# Patient Record
Sex: FEMALE | Race: WHITE | NOT HISPANIC OR LATINO
[De-identification: names, ages, dates, MRNs, and addresses within clinical notes are randomized per-mention and may not be internally consistent; named-entity substitution may affect disease eponyms.]

---

## 2017-03-22 ENCOUNTER — CHARTING TRANS (OUTPATIENT)
Dept: OTHER | Age: 22
End: 2017-03-22

## 2017-08-21 ENCOUNTER — OFFICE VISIT (OUTPATIENT)
Dept: FAMILY MEDICINE CLINIC | Facility: CLINIC | Age: 22
End: 2017-08-21

## 2017-08-21 VITALS
BODY MASS INDEX: 22.18 KG/M2 | TEMPERATURE: 98 F | WEIGHT: 138 LBS | HEART RATE: 74 BPM | HEIGHT: 66 IN | SYSTOLIC BLOOD PRESSURE: 93 MMHG | DIASTOLIC BLOOD PRESSURE: 62 MMHG

## 2017-08-21 DIAGNOSIS — F32.A DEPRESSIVE DISORDER: ICD-10-CM

## 2017-08-21 DIAGNOSIS — Z00.00 ADULT GENERAL MEDICAL EXAM: Primary | ICD-10-CM

## 2017-08-21 DIAGNOSIS — K58.1 IRRITABLE BOWEL SYNDROME WITH CONSTIPATION: ICD-10-CM

## 2017-08-21 PROCEDURE — 99385 PREV VISIT NEW AGE 18-39: CPT | Performed by: FAMILY MEDICINE

## 2017-08-21 RX ORDER — DICYCLOMINE HYDROCHLORIDE 10 MG/1
10 CAPSULE ORAL 3 TIMES DAILY
COMMUNITY
Start: 2017-07-17 | End: 2018-09-21

## 2017-08-21 RX ORDER — NORGESTREL AND ETHINYL ESTRADIOL 0.3-0.03MG
1 KIT ORAL DAILY
COMMUNITY
Start: 2017-08-07

## 2017-08-21 RX ORDER — RANITIDINE 150 MG/1
150 TABLET ORAL 2 TIMES DAILY
COMMUNITY
Start: 2017-07-17 | End: 2018-03-29 | Stop reason: ALTCHOICE

## 2017-08-21 NOTE — PROGRESS NOTES
Patient ID: Brenda Butt is a 20year old female. HPI  Patient presents with:  Establish Care  IBS    She does not smoke, she is single, she works as a . She was formally diagnosed with IBS with constipation 1 month ago.   She history on file.       Social History  Social History   Marital status: Single  Spouse name: N/A    Years of education: N/A  Number of children: N/A     Occupational History  None on file     Social History Main Topics   Smoking status: Never Smoker    Smok affect   Vitals reviewed. Blood pressure 93/62, pulse 74, temperature 97.9 °F (36.6 °C), temperature source Oral, height 5' 6\" (1.676 m), weight 138 lb (62.6 kg), last menstrual period 08/08/2017, not currently breastfeeding.          ASSESSMENT/PLAN:

## 2017-08-26 ENCOUNTER — LAB ENCOUNTER (OUTPATIENT)
Dept: LAB | Age: 22
End: 2017-08-26
Attending: FAMILY MEDICINE
Payer: COMMERCIAL

## 2017-08-26 DIAGNOSIS — Z00.00 ADULT GENERAL MEDICAL EXAM: ICD-10-CM

## 2017-08-26 DIAGNOSIS — K58.9 IRRITABLE BOWEL SYNDROME: Primary | ICD-10-CM

## 2017-08-26 LAB
ALBUMIN SERPL BCP-MCNC: 4.2 G/DL (ref 3.5–4.8)
ALBUMIN/GLOB SERPL: 1.4 {RATIO} (ref 1–2)
ALP SERPL-CCNC: 52 U/L (ref 32–100)
ALT SERPL-CCNC: 13 U/L (ref 14–54)
ANION GAP SERPL CALC-SCNC: 8 MMOL/L (ref 0–18)
AST SERPL-CCNC: 19 U/L (ref 15–41)
BASOPHILS # BLD: 0.1 K/UL (ref 0–0.2)
BASOPHILS NFR BLD: 1 %
BILIRUB SERPL-MCNC: 0.8 MG/DL (ref 0.3–1.2)
BUN SERPL-MCNC: 5 MG/DL (ref 8–20)
BUN/CREAT SERPL: 7 (ref 10–20)
CALCIUM SERPL-MCNC: 9.2 MG/DL (ref 8.5–10.5)
CHLORIDE SERPL-SCNC: 107 MMOL/L (ref 95–110)
CHOLEST SERPL-MCNC: 131 MG/DL (ref 110–200)
CO2 SERPL-SCNC: 25 MMOL/L (ref 22–32)
CREAT SERPL-MCNC: 0.71 MG/DL (ref 0.5–1.5)
CRP SERPL-MCNC: 0.5 MG/DL (ref 0–0.9)
EOSINOPHIL # BLD: 0 K/UL (ref 0–0.7)
EOSINOPHIL NFR BLD: 1 %
ERYTHROCYTE [DISTWIDTH] IN BLOOD BY AUTOMATED COUNT: 11.9 % (ref 11–15)
GLOBULIN PLAS-MCNC: 2.9 G/DL (ref 2.5–3.7)
GLUCOSE SERPL-MCNC: 78 MG/DL (ref 70–99)
HCT VFR BLD AUTO: 41.1 % (ref 35–48)
HDLC SERPL-MCNC: 42 MG/DL
HGB BLD-MCNC: 14.3 G/DL (ref 12–16)
IGA SERPL-MCNC: 113 MG/DL (ref 68–378)
LDLC SERPL CALC-MCNC: 76 MG/DL (ref 0–99)
LYMPHOCYTES # BLD: 1.4 K/UL (ref 1–4)
LYMPHOCYTES NFR BLD: 27 %
MCH RBC QN AUTO: 34 PG (ref 27–32)
MCHC RBC AUTO-ENTMCNC: 34.8 G/DL (ref 32–37)
MCV RBC AUTO: 97.4 FL (ref 80–100)
MONOCYTES # BLD: 0.5 K/UL (ref 0–1)
MONOCYTES NFR BLD: 9 %
NEUTROPHILS # BLD AUTO: 3.3 K/UL (ref 1.8–7.7)
NEUTROPHILS NFR BLD: 62 %
NONHDLC SERPL-MCNC: 89 MG/DL
OSMOLALITY UR CALC.SUM OF ELEC: 286 MOSM/KG (ref 275–295)
PLATELET # BLD AUTO: 249 K/UL (ref 140–400)
PMV BLD AUTO: 10.1 FL (ref 7.4–10.3)
POTASSIUM SERPL-SCNC: 3.9 MMOL/L (ref 3.3–5.1)
PROT SERPL-MCNC: 7.1 G/DL (ref 5.9–8.4)
RBC # BLD AUTO: 4.22 M/UL (ref 3.7–5.4)
SODIUM SERPL-SCNC: 140 MMOL/L (ref 136–144)
T4 FREE SERPL-MCNC: 0.8 NG/DL (ref 0.58–1.64)
TRIGL SERPL-MCNC: 63 MG/DL (ref 1–149)
TSH SERPL-ACNC: 0.98 UIU/ML (ref 0.45–5.33)
WBC # BLD AUTO: 5.3 K/UL (ref 4–11)

## 2017-08-26 PROCEDURE — 80061 LIPID PANEL: CPT

## 2017-08-26 PROCEDURE — 83516 IMMUNOASSAY NONANTIBODY: CPT

## 2017-08-26 PROCEDURE — 84443 ASSAY THYROID STIM HORMONE: CPT

## 2017-08-26 PROCEDURE — 80053 COMPREHEN METABOLIC PANEL: CPT

## 2017-08-26 PROCEDURE — 82784 ASSAY IGA/IGD/IGG/IGM EACH: CPT

## 2017-08-26 PROCEDURE — 84439 ASSAY OF FREE THYROXINE: CPT

## 2017-08-26 PROCEDURE — 36415 COLL VENOUS BLD VENIPUNCTURE: CPT

## 2017-08-26 PROCEDURE — 85025 COMPLETE CBC W/AUTO DIFF WBC: CPT

## 2017-08-26 PROCEDURE — 86140 C-REACTIVE PROTEIN: CPT

## 2017-08-29 LAB — TTG IGA SER-ACNC: 0.2 U/ML (ref ?–7)

## 2017-10-02 ENCOUNTER — OFFICE VISIT (OUTPATIENT)
Dept: FAMILY MEDICINE CLINIC | Facility: CLINIC | Age: 22
End: 2017-10-02

## 2017-10-02 VITALS
HEIGHT: 66 IN | SYSTOLIC BLOOD PRESSURE: 100 MMHG | HEART RATE: 72 BPM | TEMPERATURE: 98 F | WEIGHT: 132 LBS | BODY MASS INDEX: 21.21 KG/M2 | DIASTOLIC BLOOD PRESSURE: 64 MMHG

## 2017-10-02 DIAGNOSIS — F32.A DEPRESSIVE DISORDER: ICD-10-CM

## 2017-10-02 DIAGNOSIS — K58.1 IRRITABLE BOWEL SYNDROME WITH CONSTIPATION: Primary | ICD-10-CM

## 2017-10-02 DIAGNOSIS — F41.9 ANXIETY: ICD-10-CM

## 2017-10-02 PROCEDURE — 99212 OFFICE O/P EST SF 10 MIN: CPT | Performed by: FAMILY MEDICINE

## 2017-10-02 PROCEDURE — 99214 OFFICE O/P EST MOD 30 MIN: CPT | Performed by: FAMILY MEDICINE

## 2017-10-02 NOTE — PROGRESS NOTES
Patient ID: Elfrieda Scheuermann is a 20year old female. HPI  Patient presents with:  Medication Follow-Up  Depression  Stomach Pain    She states that the Zoloft 50 mg really did make her enjoy her life more.   She states she is happier but her abdominal p Patient appears well-developed and well-nourished. HENT:   Mouth/Throat: Mucous membranes are normal.   Neck: Normal range of motion. Neck supple. No thyromegaly   Cardiovascular: Normal rate, regular rhythm and normal heart sounds.     Pulmonary/Chest: E

## 2017-10-26 ENCOUNTER — HOSPITAL (OUTPATIENT)
Dept: OTHER | Age: 22
End: 2017-10-26
Attending: INTERNAL MEDICINE

## 2018-01-16 ENCOUNTER — OFFICE VISIT (OUTPATIENT)
Dept: FAMILY MEDICINE CLINIC | Facility: CLINIC | Age: 23
End: 2018-01-16

## 2018-01-16 VITALS
SYSTOLIC BLOOD PRESSURE: 118 MMHG | WEIGHT: 128 LBS | HEART RATE: 68 BPM | BODY MASS INDEX: 20.57 KG/M2 | TEMPERATURE: 98 F | HEIGHT: 66 IN | DIASTOLIC BLOOD PRESSURE: 73 MMHG

## 2018-01-16 DIAGNOSIS — F41.9 ANXIETY: ICD-10-CM

## 2018-01-16 DIAGNOSIS — K58.1 IRRITABLE BOWEL SYNDROME WITH CONSTIPATION: ICD-10-CM

## 2018-01-16 DIAGNOSIS — F32.A DEPRESSIVE DISORDER: Primary | ICD-10-CM

## 2018-01-16 DIAGNOSIS — Z23 NEED FOR VACCINATION: ICD-10-CM

## 2018-01-16 DIAGNOSIS — R68.82 LOW LIBIDO: ICD-10-CM

## 2018-01-16 PROCEDURE — 99212 OFFICE O/P EST SF 10 MIN: CPT | Performed by: FAMILY MEDICINE

## 2018-01-16 PROCEDURE — 90715 TDAP VACCINE 7 YRS/> IM: CPT | Performed by: FAMILY MEDICINE

## 2018-01-16 PROCEDURE — 90471 IMMUNIZATION ADMIN: CPT | Performed by: FAMILY MEDICINE

## 2018-01-16 PROCEDURE — 99214 OFFICE O/P EST MOD 30 MIN: CPT | Performed by: FAMILY MEDICINE

## 2018-01-16 RX ORDER — BUPROPION HYDROCHLORIDE 150 MG/1
150 TABLET, EXTENDED RELEASE ORAL DAILY
Qty: 90 TABLET | Refills: 0 | Status: SHIPPED | OUTPATIENT
Start: 2018-01-16 | End: 2018-03-29

## 2018-01-16 RX ORDER — AMITRIPTYLINE HYDROCHLORIDE 10 MG/1
TABLET, FILM COATED ORAL
COMMUNITY
Start: 2017-12-21 | End: 2018-03-29 | Stop reason: ALTCHOICE

## 2018-01-16 NOTE — PROGRESS NOTES
Patient ID: Harley Deshpande is a 20year old female. ASSESSMENT/PLAN: From my visit on 10/2/2017     Diagnoses and all orders for this visit:    Irritable bowel syndrome with constipation  -     Sertraline HCl 50 MG Oral Tab;  Take 1.5 tablets (75 mg Prescriptions:  Amitriptyline HCl 10 MG Oral Tab  Disp:  Rfl:    BuPROPion HCl ER, SR, 150 MG Oral Tablet 12 Hr Take 1 tablet (150 mg total) by mouth daily.  Disp: 90 tablet Rfl: 0   Sertraline HCl 50 MG Oral Tab Take 1.5 tablets (75 mg total) by mouth ana BuPROPion HCl ER, SR, 150 MG Oral Tablet 12 Hr; Take 1 tablet (150 mg total) by mouth daily.     Irritable bowel syndrome with constipation  Continue following instructions from your GI doctor  Low libido  -     BuPROPion HCl ER, SR, 150 MG Oral Tablet 1

## 2018-03-29 ENCOUNTER — OFFICE VISIT (OUTPATIENT)
Dept: FAMILY MEDICINE CLINIC | Facility: CLINIC | Age: 23
End: 2018-03-29

## 2018-03-29 VITALS
SYSTOLIC BLOOD PRESSURE: 94 MMHG | WEIGHT: 122.38 LBS | DIASTOLIC BLOOD PRESSURE: 60 MMHG | BODY MASS INDEX: 20 KG/M2 | HEART RATE: 66 BPM | TEMPERATURE: 97 F

## 2018-03-29 DIAGNOSIS — F32.A DEPRESSIVE DISORDER: ICD-10-CM

## 2018-03-29 DIAGNOSIS — K58.1 IRRITABLE BOWEL SYNDROME WITH CONSTIPATION: ICD-10-CM

## 2018-03-29 DIAGNOSIS — F41.9 ANXIETY: ICD-10-CM

## 2018-03-29 PROCEDURE — 99214 OFFICE O/P EST MOD 30 MIN: CPT | Performed by: FAMILY MEDICINE

## 2018-03-29 PROCEDURE — 99212 OFFICE O/P EST SF 10 MIN: CPT | Performed by: FAMILY MEDICINE

## 2018-03-29 RX ORDER — SERTRALINE HYDROCHLORIDE 100 MG/1
100 TABLET, FILM COATED ORAL DAILY
Qty: 90 TABLET | Refills: 1 | Status: SHIPPED | OUTPATIENT
Start: 2018-03-29 | End: 2018-09-21

## 2018-03-29 NOTE — PROGRESS NOTES
Patient ID: Lary Buckner is a 20year old female.     HPI  Patient presents with:  Depression    She has been on the bupropion since January will be added to the Zoloft but she feels that she has suicidal thoughts with the bupropion so we like to take h Patient is oriented to person, place, and time. Patient appears well-developed and well-nourished. HENT:   Mouth/Throat: Mucous membranes are normal.   Neck: Normal range of motion. Neck supple.  No thyromegaly   Cardiovascular: Normal rate, regular rhyth

## 2018-04-09 ENCOUNTER — HOSPITAL (OUTPATIENT)
Dept: OTHER | Age: 23
End: 2018-04-09
Attending: EMERGENCY MEDICINE

## 2018-09-21 ENCOUNTER — OFFICE VISIT (OUTPATIENT)
Dept: FAMILY MEDICINE CLINIC | Facility: CLINIC | Age: 23
End: 2018-09-21
Payer: COMMERCIAL

## 2018-09-21 VITALS
WEIGHT: 122 LBS | HEART RATE: 67 BPM | BODY MASS INDEX: 19.61 KG/M2 | DIASTOLIC BLOOD PRESSURE: 63 MMHG | TEMPERATURE: 98 F | SYSTOLIC BLOOD PRESSURE: 101 MMHG | HEIGHT: 66 IN

## 2018-09-21 DIAGNOSIS — K58.1 IRRITABLE BOWEL SYNDROME WITH CONSTIPATION: ICD-10-CM

## 2018-09-21 DIAGNOSIS — F32.A DEPRESSIVE DISORDER: Primary | ICD-10-CM

## 2018-09-21 DIAGNOSIS — F41.9 ANXIETY: ICD-10-CM

## 2018-09-21 DIAGNOSIS — F32.A DEPRESSIVE DISORDER: ICD-10-CM

## 2018-09-21 PROCEDURE — 99212 OFFICE O/P EST SF 10 MIN: CPT | Performed by: FAMILY MEDICINE

## 2018-09-21 PROCEDURE — 99214 OFFICE O/P EST MOD 30 MIN: CPT | Performed by: FAMILY MEDICINE

## 2018-09-21 RX ORDER — DICYCLOMINE HYDROCHLORIDE 10 MG/1
10 CAPSULE ORAL
Qty: 90 CAPSULE | Refills: 1 | Status: SHIPPED | OUTPATIENT
Start: 2018-09-21 | End: 2019-12-14

## 2018-09-21 NOTE — PROGRESS NOTES
Patient ID: Jaden Medel is a 24year old female.     HPI  Patient presents with:  Depression  Medication Follow-Up  She states she is tolerating the 75 mg of the Zoloft but is actually cutting the 100 mg in half and then take another half which I reall tablet by mouth daily.  Disp:  Rfl:      Allergies:  Gluten Meal               Lactose                   Wellbutrin [Bupropi*    OTHER (SEE COMMENTS)    Comment:Suicidal thoughts   PHYSICAL EXAM:   Physical Exam  Blood pressure 101/63, pulse 67, temperature types were placed in this encounter. Follow up if symptoms persist.  Take medicine (if given) as prescribed. Approach to treatment discussed and patient/family member understands and agrees to plan.      Return in about 4 months (around 1/21/2019) f

## 2018-11-05 VITALS
WEIGHT: 142 LBS | HEIGHT: 66 IN | HEART RATE: 74 BPM | BODY MASS INDEX: 22.82 KG/M2 | SYSTOLIC BLOOD PRESSURE: 80 MMHG | DIASTOLIC BLOOD PRESSURE: 45 MMHG

## 2018-11-29 ENCOUNTER — HOSPITAL ENCOUNTER (OUTPATIENT)
Age: 23
Discharge: HOME | End: 2018-11-29
Attending: EMERGENCY MEDICINE
Payer: COMMERCIAL

## 2018-11-29 VITALS
HEART RATE: 68 BPM | WEIGHT: 140 LBS | TEMPERATURE: 98 F | SYSTOLIC BLOOD PRESSURE: 112 MMHG | DIASTOLIC BLOOD PRESSURE: 74 MMHG | OXYGEN SATURATION: 100 % | BODY MASS INDEX: 23 KG/M2 | RESPIRATION RATE: 16 BRPM

## 2018-11-29 DIAGNOSIS — S29.011A CHEST WALL MUSCLE STRAIN, INITIAL ENCOUNTER: Primary | ICD-10-CM

## 2018-11-29 PROCEDURE — 99202 OFFICE O/P NEW SF 15 MIN: CPT

## 2018-11-29 PROCEDURE — 99212 OFFICE O/P EST SF 10 MIN: CPT

## 2018-11-29 NOTE — ED PROVIDER NOTES
Patient Seen in: [facility] In [address] [place] [address]    History   Patient presents with:  Upper Extremity Injury (musculoskeletal)    Stated Complaint: chest and left side pain    HPI    25 yo female with one week of left upper chest pain/should Pulmonary/Chest: Effort normal and breath sounds normal. She exhibits tenderness (left upper outer chest wall). Abdominal: Soft. Bowel sounds are normal. She exhibits no distension and no mass. There is no tenderness.  There is no rebound and no guardin

## 2018-11-29 NOTE — ED INITIAL ASSESSMENT (HPI)
7 days ago ant chest pain and has moves to left shoulder no trauma increase pain with range of motion.

## 2019-02-10 ENCOUNTER — HOSPITAL (OUTPATIENT)
Dept: OTHER | Age: 24
End: 2019-02-10
Attending: EMERGENCY MEDICINE

## 2019-02-10 LAB
ALBUMIN SERPL-MCNC: 4.2 GM/DL (ref 3.6–5.1)
ALBUMIN/GLOB SERPL: 1.2 {RATIO} (ref 1–2.4)
ALP SERPL-CCNC: 69 UNIT/L (ref 45–117)
ALT SERPL-CCNC: 16 UNIT/L
ANALYZER ANC (IANC): ABNORMAL
ANION GAP SERPL CALC-SCNC: 16 MMOL/L (ref 10–20)
AST SERPL-CCNC: 19 UNIT/L
BASOPHILS # BLD: 0 THOUSAND/MCL (ref 0–0.3)
BASOPHILS NFR BLD: 1 %
BILIRUB SERPL-MCNC: 0.3 MG/DL (ref 0.2–1)
BUN SERPL-MCNC: 7 MG/DL (ref 6–20)
BUN/CREAT SERPL: 13 (ref 7–25)
CALCIUM SERPL-MCNC: 8.8 MG/DL (ref 8.4–10.2)
CHLORIDE: 105 MMOL/L (ref 98–107)
CO2 SERPL-SCNC: 25 MMOL/L (ref 21–32)
CREAT SERPL-MCNC: 0.56 MG/DL (ref 0.51–0.95)
DIFFERENTIAL METHOD BLD: ABNORMAL
EOSINOPHIL # BLD: 0 THOUSAND/MCL (ref 0.1–0.5)
EOSINOPHIL NFR BLD: 0 %
ERYTHROCYTE [DISTWIDTH] IN BLOOD: 11.6 % (ref 11–15)
GLOBULIN SER-MCNC: 3.4 GM/DL (ref 2–4)
GLUCOSE SERPL-MCNC: 91 MG/DL (ref 65–99)
HCG POINT OF CARE (5HGRST): NEGATIVE
HCV AB SERPL QL IA: NEGATIVE
HEMATOCRIT: 42 % (ref 36–46.5)
HGB BLD-MCNC: 14.6 GM/DL (ref 12–15.5)
HIV1 AB SERPL QL IA: ABNORMAL
HIV1 AB SERPL QL IA: NONREACTIVE
IMM GRANULOCYTES # BLD AUTO: 0 THOUSAND/MCL (ref 0–0.2)
IMM GRANULOCYTES NFR BLD: 0 %
LYMPHOCYTES # BLD: 1.8 THOUSAND/MCL (ref 1–4.8)
LYMPHOCYTES NFR BLD: 26 %
MCH RBC QN AUTO: 33.8 PG (ref 26–34)
MCHC RBC AUTO-ENTMCNC: 34.8 GM/DL (ref 32–36.5)
MCV RBC AUTO: 97.2 FL (ref 78–100)
MONOCYTES # BLD: 0.6 THOUSAND/MCL (ref 0.3–0.9)
MONOCYTES NFR BLD: 10 %
NEUTROPHILS # BLD: 4.2 THOUSAND/MCL (ref 1.8–7.7)
NEUTROPHILS NFR BLD: 63 %
NEUTS SEG NFR BLD: ABNORMAL %
NRBC (NRBCRE): 0 /100 WBC
PLATELET # BLD: 305 THOUSAND/MCL (ref 140–450)
POTASSIUM SERPL-SCNC: 3.9 MMOL/L (ref 3.4–5.1)
PROT SERPL-MCNC: 7.6 GM/DL (ref 6.4–8.2)
RBC # BLD: 4.32 MILLION/MCL (ref 4–5.2)
SODIUM SERPL-SCNC: 142 MMOL/L (ref 135–145)
WBC # BLD: 6.7 THOUSAND/MCL (ref 4.2–11)

## 2019-02-11 ENCOUNTER — NURSE TRIAGE (OUTPATIENT)
Dept: OTHER | Age: 24
End: 2019-02-11

## 2019-02-11 ENCOUNTER — DIAGNOSTIC TRANS (OUTPATIENT)
Dept: OTHER | Age: 24
End: 2019-02-11

## 2019-02-11 ENCOUNTER — HOSPITAL (OUTPATIENT)
Dept: OTHER | Age: 24
End: 2019-02-11
Attending: EMERGENCY MEDICINE

## 2019-02-11 LAB
ALBUMIN SERPL-MCNC: 3.4 GM/DL (ref 3.6–5.1)
ALBUMIN/GLOB SERPL: 0.9 {RATIO} (ref 1–2.4)
ALP SERPL-CCNC: 62 UNIT/L (ref 45–117)
ALT SERPL-CCNC: 17 UNIT/L
ANALYZER ANC (IANC): ABNORMAL
ANION GAP SERPL CALC-SCNC: 13 MMOL/L (ref 10–20)
AST SERPL-CCNC: 23 UNIT/L
BASOPHILS # BLD: 0.1 THOUSAND/MCL (ref 0–0.3)
BASOPHILS NFR BLD: 1 %
BILIRUB SERPL-MCNC: 0.4 MG/DL (ref 0.2–1)
BUN SERPL-MCNC: 10 MG/DL (ref 6–20)
BUN/CREAT SERPL: 16 (ref 7–25)
CALCIUM SERPL-MCNC: 8.3 MG/DL (ref 8.4–10.2)
CHLORIDE: 101 MMOL/L (ref 98–107)
CO2 SERPL-SCNC: 26 MMOL/L (ref 21–32)
CREAT SERPL-MCNC: 0.62 MG/DL (ref 0.51–0.95)
D DIMER PPP FEU-MCNC: 0.41 MG/L FEU
DIFFERENTIAL METHOD BLD: ABNORMAL
EOSINOPHIL # BLD: 0.1 THOUSAND/MCL (ref 0.1–0.5)
EOSINOPHIL NFR BLD: 1 %
ERYTHROCYTE [DISTWIDTH] IN BLOOD: 11.6 % (ref 11–15)
GLOBULIN SER-MCNC: 3.7 GM/DL (ref 2–4)
GLUCOSE SERPL-MCNC: 104 MG/DL (ref 65–99)
HEMATOCRIT: 39.3 % (ref 36–46.5)
HGB BLD-MCNC: 13.3 GM/DL (ref 12–15.5)
IMM GRANULOCYTES # BLD AUTO: 0 THOUSAND/MCL (ref 0–0.2)
IMM GRANULOCYTES NFR BLD: 0 %
LYMPHOCYTES # BLD: 1.2 THOUSAND/MCL (ref 1–4.8)
LYMPHOCYTES NFR BLD: 12 %
MCH RBC QN AUTO: 33.3 PG (ref 26–34)
MCHC RBC AUTO-ENTMCNC: 33.8 GM/DL (ref 32–36.5)
MCV RBC AUTO: 98.5 FL (ref 78–100)
MONOCYTES # BLD: 1 THOUSAND/MCL (ref 0.3–0.9)
MONOCYTES NFR BLD: 10 %
NEUTROPHILS # BLD: 7.8 THOUSAND/MCL (ref 1.8–7.7)
NEUTROPHILS NFR BLD: 76 %
NEUTS SEG NFR BLD: ABNORMAL %
NRBC (NRBCRE): 0 /100 WBC
PLATELET # BLD: 240 THOUSAND/MCL (ref 140–450)
POTASSIUM SERPL-SCNC: 3.5 MMOL/L (ref 3.4–5.1)
PROT SERPL-MCNC: 7.1 GM/DL (ref 6.4–8.2)
RBC # BLD: 3.99 MILLION/MCL (ref 4–5.2)
SODIUM SERPL-SCNC: 136 MMOL/L (ref 135–145)
TROPONIN I SERPL HS-MCNC: <0.02 NG/ML
WBC # BLD: 10.2 THOUSAND/MCL (ref 4.2–11)

## 2019-02-11 NOTE — TELEPHONE ENCOUNTER
Action Requested: Summary for Provider     []  Critical Lab, Recommendations Needed  [] Need Additional Advice  [x]   FYI    []   Need Orders  [] Need Medications Sent to Pharmacy  []  Other     SUMMARY: FYI Dr. Dillan Lanza.  Advised patient to go to ER for rect her house. Her boyfriend is with her and will be driving her now.      Reason for call: Rectal Bleeding and Chest Pain Angina (cardiovascular)  Onset:    Feb 10, 2019  Feb 10, 2019                         Reason for Disposition  • SEVERE rectal bleeding (la

## 2019-02-11 NOTE — TELEPHONE ENCOUNTER
I am so sorry. I agree with her going to the emergency room. Please make sure when she follows up with me she brings the records for both visits from the emergency room or has them faxed to us to [phone].
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
## 2019-03-25 DIAGNOSIS — F41.9 ANXIETY: ICD-10-CM

## 2019-03-25 DIAGNOSIS — F32.A DEPRESSIVE DISORDER: ICD-10-CM

## 2019-04-09 ENCOUNTER — OFFICE VISIT (OUTPATIENT)
Dept: FAMILY MEDICINE CLINIC | Facility: CLINIC | Age: 24
End: 2019-04-09
Payer: COMMERCIAL

## 2019-04-09 ENCOUNTER — LAB ENCOUNTER (OUTPATIENT)
Dept: LAB | Age: 24
End: 2019-04-09
Attending: FAMILY MEDICINE
Payer: COMMERCIAL

## 2019-04-09 VITALS
HEART RATE: 68 BPM | BODY MASS INDEX: 25.25 KG/M2 | HEIGHT: 66 IN | SYSTOLIC BLOOD PRESSURE: 105 MMHG | DIASTOLIC BLOOD PRESSURE: 70 MMHG | WEIGHT: 157.13 LBS | RESPIRATION RATE: 12 BRPM | TEMPERATURE: 99 F

## 2019-04-09 DIAGNOSIS — K58.1 IRRITABLE BOWEL SYNDROME WITH CONSTIPATION: ICD-10-CM

## 2019-04-09 DIAGNOSIS — F41.9 ANXIETY: ICD-10-CM

## 2019-04-09 DIAGNOSIS — Z12.4 CERVICAL CANCER SCREENING: ICD-10-CM

## 2019-04-09 DIAGNOSIS — Z00.00 ADULT GENERAL MEDICAL EXAM: ICD-10-CM

## 2019-04-09 DIAGNOSIS — IMO0002 SEXUAL ASSAULT BY BODILY FORCE BY PERSON UNKNOWN TO VICTIM: ICD-10-CM

## 2019-04-09 DIAGNOSIS — F32.A DEPRESSIVE DISORDER: ICD-10-CM

## 2019-04-09 DIAGNOSIS — Z00.00 ADULT GENERAL MEDICAL EXAM: Primary | ICD-10-CM

## 2019-04-09 PROCEDURE — 99395 PREV VISIT EST AGE 18-39: CPT | Performed by: FAMILY MEDICINE

## 2019-04-09 PROCEDURE — 87389 HIV-1 AG W/HIV-1&-2 AB AG IA: CPT

## 2019-04-09 PROCEDURE — 84443 ASSAY THYROID STIM HORMONE: CPT

## 2019-04-09 PROCEDURE — 99212 OFFICE O/P EST SF 10 MIN: CPT | Performed by: FAMILY MEDICINE

## 2019-04-09 PROCEDURE — 80053 COMPREHEN METABOLIC PANEL: CPT

## 2019-04-09 PROCEDURE — 85025 COMPLETE CBC W/AUTO DIFF WBC: CPT

## 2019-04-09 PROCEDURE — 80061 LIPID PANEL: CPT

## 2019-04-09 PROCEDURE — 86780 TREPONEMA PALLIDUM: CPT

## 2019-04-09 PROCEDURE — 36415 COLL VENOUS BLD VENIPUNCTURE: CPT
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
## 2019-04-10 PROBLEM — D75.89 MACROCYTOSIS: Status: ACTIVE | Noted: 2019-04-10

## 2019-07-08 DIAGNOSIS — F32.A DEPRESSIVE DISORDER: ICD-10-CM

## 2019-07-08 DIAGNOSIS — F41.9 ANXIETY: ICD-10-CM

## 2019-07-08 NOTE — TELEPHONE ENCOUNTER
Refill passed per [facility], [facility] protocol.   Refill Protocol Appointment Criteria  · Appointment scheduled in the past 6 months or in the next 3 months  Recent Outpatient Visits            3 months ago Adult general medical exam    [address] [facility]

## 2019-08-08 ENCOUNTER — HOSPITAL (OUTPATIENT)
Dept: OTHER | Age: 24
End: 2019-08-08

## 2019-08-08 ENCOUNTER — DIAGNOSTIC TRANS (OUTPATIENT)
Dept: OTHER | Age: 24
End: 2019-08-08

## 2019-08-08 LAB
ALBUMIN SERPL-MCNC: 3.8 G/DL (ref 3.6–5.1)
ALBUMIN/GLOB SERPL: 1.1 {RATIO} (ref 1–2.4)
ALP SERPL-CCNC: 83 UNITS/L (ref 45–117)
ALT SERPL-CCNC: 11 UNITS/L
ANALYZER ANC (IANC): NORMAL
ANION GAP SERPL CALC-SCNC: 11 MMOL/L (ref 10–20)
APPEARANCE UR: CLEAR
AST SERPL-CCNC: 18 UNITS/L
BASOPHILS # BLD: 0.1 K/MCL (ref 0–0.3)
BASOPHILS NFR BLD: 1 %
BILIRUB SERPL-MCNC: 0.3 MG/DL (ref 0.2–1)
BILIRUB UR QL STRIP: NEGATIVE
BUN SERPL-MCNC: 11 MG/DL (ref 6–20)
BUN/CREAT SERPL: 16 (ref 7–25)
CALCIUM SERPL-MCNC: 9 MG/DL (ref 8.4–10.2)
CHLORIDE SERPL-SCNC: 103 MMOL/L (ref 98–107)
CO2 SERPL-SCNC: 30 MMOL/L (ref 21–32)
COLOR UR: YELLOW
CREAT SERPL-MCNC: 0.69 MG/DL (ref 0.51–0.95)
DIFFERENTIAL METHOD BLD: NORMAL
EOSINOPHIL # BLD: 0.2 K/MCL (ref 0.1–0.5)
EOSINOPHIL NFR BLD: 2 %
ERYTHROCYTE [DISTWIDTH] IN BLOOD: 11.9 % (ref 11–15)
GLOBULIN SER-MCNC: 3.6 G/DL (ref 2–4)
GLUCOSE SERPL-MCNC: 86 MG/DL (ref 65–99)
GLUCOSE UR STRIP-MCNC: NEGATIVE MG/DL
HCG POINT OF CARE (5HGRST): NEGATIVE
HCT VFR BLD CALC: 41 % (ref 36–46.5)
HEMOCCULT STL QL: ABNORMAL
HGB BLD-MCNC: 13.9 G/DL (ref 12–15.5)
IMM GRANULOCYTES # BLD AUTO: 0 K/MCL (ref 0–0.2)
IMM GRANULOCYTES NFR BLD: 0 %
KETONES UR STRIP-MCNC: NEGATIVE MG/DL
LEUKOCYTE ESTERASE UR QL STRIP: NEGATIVE
LYMPHOCYTES # BLD: 1.7 K/MCL (ref 1–4.8)
LYMPHOCYTES NFR BLD: 25 %
MCH RBC QN AUTO: 33.5 PG (ref 26–34)
MCHC RBC AUTO-ENTMCNC: 33.9 G/DL (ref 32–36.5)
MCV RBC AUTO: 98.8 FL (ref 78–100)
MONOCYTES # BLD: 0.9 K/MCL (ref 0.3–0.9)
MONOCYTES NFR BLD: 12 %
NEUTROPHILS # BLD: 4 K/MCL (ref 1.8–7.7)
NEUTROPHILS NFR BLD: 60 %
NEUTS SEG NFR BLD: NORMAL %
NITRITE UR QL STRIP: NEGATIVE
NRBC (NRBCRE): 0 /100 WBC
PH UR STRIP: 7 UNITS (ref 5–7)
PLATELET # BLD: 275 K/MCL (ref 140–450)
POTASSIUM SERPL-SCNC: 4.3 MMOL/L (ref 3.4–5.1)
PROT SERPL-MCNC: 7.4 G/DL (ref 6.4–8.2)
PROT UR STRIP-MCNC: NEGATIVE MG/DL
RBC # BLD: 4.15 MIL/MCL (ref 4–5.2)
SODIUM SERPL-SCNC: 140 MMOL/L (ref 135–145)
SP GR UR STRIP: 1.01 (ref 1–1.03)
UROBILINOGEN UR STRIP-MCNC: 0.2 MG/DL (ref 0–1)
WBC # BLD: 6.8 K/MCL (ref 4.2–11)

## 2019-08-08 PROCEDURE — 99284 EMERGENCY DEPT VISIT MOD MDM: CPT | Performed by: EMERGENCY MEDICINE

## 2019-08-10 ENCOUNTER — OFFICE VISIT (OUTPATIENT)
Dept: FAMILY MEDICINE CLINIC | Facility: CLINIC | Age: 24
End: 2019-08-10
Payer: COMMERCIAL

## 2019-08-10 ENCOUNTER — LAB ENCOUNTER (OUTPATIENT)
Dept: LAB | Age: 24
End: 2019-08-10
Attending: FAMILY MEDICINE
Payer: COMMERCIAL

## 2019-08-10 VITALS
HEART RATE: 62 BPM | RESPIRATION RATE: 20 BRPM | WEIGHT: 156 LBS | TEMPERATURE: 97 F | DIASTOLIC BLOOD PRESSURE: 61 MMHG | BODY MASS INDEX: 25.07 KG/M2 | SYSTOLIC BLOOD PRESSURE: 95 MMHG | HEIGHT: 66 IN

## 2019-08-10 DIAGNOSIS — R26.89 IMBALANCE: Primary | ICD-10-CM

## 2019-08-10 DIAGNOSIS — IMO0002 SEXUAL ASSAULT BY BODILY FORCE BY PERSON UNKNOWN TO VICTIM: ICD-10-CM

## 2019-08-10 DIAGNOSIS — R41.840 CONCENTRATION DEFICIT: ICD-10-CM

## 2019-08-10 DIAGNOSIS — R51.9 FRONTAL HEADACHE: ICD-10-CM

## 2019-08-10 DIAGNOSIS — R61 UNEXPLAINED NIGHT SWEATS: ICD-10-CM

## 2019-08-10 DIAGNOSIS — G47.9 SLEEP DISORDER: ICD-10-CM

## 2019-08-10 DIAGNOSIS — R35.89 POLYURIA: ICD-10-CM

## 2019-08-10 DIAGNOSIS — K58.1 IRRITABLE BOWEL SYNDROME WITH CONSTIPATION: ICD-10-CM

## 2019-08-10 DIAGNOSIS — R41.3 SHORT-TERM MEMORY LOSS: ICD-10-CM

## 2019-08-10 DIAGNOSIS — R53.83 LETHARGY: ICD-10-CM

## 2019-08-10 DIAGNOSIS — R63.0 DECREASED APPETITE: ICD-10-CM

## 2019-08-10 DIAGNOSIS — R26.89 IMBALANCE: ICD-10-CM

## 2019-08-10 DIAGNOSIS — R11.0 NAUSEA: ICD-10-CM

## 2019-08-10 LAB
CRP SERPL-MCNC: 0.29 MG/DL (ref ?–0.3)
ERYTHROCYTE [SEDIMENTATION RATE] IN BLOOD: 7 MM/HR (ref 0–20)
HAV IGM SER QL: 1.9 MG/DL (ref 1.6–2.6)
RHEUMATOID FACT SERPL-ACNC: <10 IU/ML (ref ?–15)

## 2019-08-10 PROCEDURE — 83735 ASSAY OF MAGNESIUM: CPT

## 2019-08-10 PROCEDURE — 87389 HIV-1 AG W/HIV-1&-2 AB AG IA: CPT

## 2019-08-10 PROCEDURE — 86039 ANTINUCLEAR ANTIBODIES (ANA): CPT

## 2019-08-10 PROCEDURE — 86140 C-REACTIVE PROTEIN: CPT

## 2019-08-10 PROCEDURE — 85652 RBC SED RATE AUTOMATED: CPT

## 2019-08-10 PROCEDURE — 86780 TREPONEMA PALLIDUM: CPT

## 2019-08-10 PROCEDURE — 86038 ANTINUCLEAR ANTIBODIES: CPT

## 2019-08-10 PROCEDURE — 36415 COLL VENOUS BLD VENIPUNCTURE: CPT

## 2019-08-10 PROCEDURE — 86592 SYPHILIS TEST NON-TREP QUAL: CPT

## 2019-08-10 PROCEDURE — 99215 OFFICE O/P EST HI 40 MIN: CPT | Performed by: FAMILY MEDICINE

## 2019-08-10 PROCEDURE — 82607 VITAMIN B-12: CPT | Performed by: FAMILY MEDICINE

## 2019-08-10 PROCEDURE — 86431 RHEUMATOID FACTOR QUANT: CPT

## 2019-08-10 NOTE — PROGRESS NOTES
Patient ID: Tessie Blizzard is a 21year old female. HPI  Patient presents with:  Er F/u: Patient present with ER follow up - Fatigue and headache      Telephone message from 2/11/29    Patient called.  States that she was recently discharged from the  (cardiovascular)  Onset:    Feb 10, 2019  Feb 10, 2019    ========================================================================  She is here with her mother and here for something completely different than the above HPI.   The patient comes in with her m : 156 lb (70.8 kg)  04/09/19 : 157 lb 1.6 oz (71.3 kg)  11/29/18 : 140 lb (63.5 kg)  09/21/18 : 122 lb (55.3 kg)  03/29/18 : 122 lb 6.4 oz (55.5 kg)  01/16/18 : 128 lb (58.1 kg)      BMI Readings from Last 6 Encounters:  08/10/19 : 25.18 kg/m²  04/09/19 : COMMENTS)    Comment:Suicidal thoughts   PHYSICAL EXAM:   Physical Exam  Blood pressure 95/61, pulse 62, temperature 96.9 °F (36.1 °C), temperature source Oral, resp. rate 20, height 5' 6\" (1.676 m), weight 156 lb (70.8 kg), not currently breastfeeding. Future  -     C-REACTIVE PROTEIN; Future  -     SED RATE, ALFONSO (AUTOMATED); Future  -     RHEUMATOID ARTHRITIS FACTOR;  Future  Numerous complaints with different review of systems but her exam is completely normal.  I will go ahead and order an MRI o psychosomatic but we will await the labs and MRI. Unexplained night sweats  This does not look like a person with tuberculosis. She has no lymph node swelling. There is no diaphoresis of her skin .       Referrals (if applicable)  No orders of the defi

## 2019-08-12 ENCOUNTER — TELEPHONE (OUTPATIENT)
Dept: OTHER | Age: 24
End: 2019-08-12

## 2019-08-12 ENCOUNTER — TELEPHONE (OUTPATIENT)
Dept: FAMILY MEDICINE CLINIC | Facility: CLINIC | Age: 24
End: 2019-08-12

## 2019-08-12 LAB
NUCLEAR IGG TITR SER IF: POSITIVE {TITER}
T PALLIDUM AB SER QL: POSITIVE

## 2019-08-12 NOTE — TELEPHONE ENCOUNTER
Hand off report given to [facility]  ( triage )  to follow up on below status

## 2019-08-12 NOTE — TELEPHONE ENCOUNTER
This case is pending with insurance for authorization. Patient will be notified with determination.     Alex Jarrett

## 2019-08-12 NOTE — TELEPHONE ENCOUNTER
Spoke with Zac Condon at [phone] states she notified SV ( at 4pm )  regarding this patient and her abnormal labs. States he will be working with SV until 7pm this evening.  Reminded Zac Condon our phones close at 6pm

## 2019-08-12 NOTE — TELEPHONE ENCOUNTER
Called Dr Juan Morrison office and spoke with Andre Carlson, [facility] [address] number given and states that PCP is with the patient right now and she will let him know about the results.     Please reply to mary: DEMARIO Liz

## 2019-08-12 NOTE — TELEPHONE ENCOUNTER
Stephanie calling and reported positive results of T Palllidium screening. Message sent via [employer] to Dr Giovanni Albarran to check the encounter, will wait for the response.     Please reply to pool: EM RN TRIAGE       Ref Range & Units 8/10/19  1:05 PM   Treponemal An

## 2019-08-12 NOTE — TELEPHONE ENCOUNTER
Pt called in stating that she attempted to contact her insurance regarding her MRI and they needed the procedure and diagnosis codes in order to verify coverage. Please advise.

## 2019-08-12 NOTE — TELEPHONE ENCOUNTER
Managed care dept can you please assist patient with this request, and or inform her of your findings with the PA process for her test, thank you.  Please reply to pool: DEMARIO MANAGED CARE - MAIN

## 2019-08-13 LAB
ANA NUCLEOLAR TITR SER IF: 160 {TITER}
RAPID PLASMA REAGIN (RPR): NON REACTIVE

## 2019-08-13 NOTE — TELEPHONE ENCOUNTER
When the initial test was positive there is an automatic further testing done for confirmation.   I will await the confirmatory test.

## 2019-08-14 LAB — TREPONEMA PALLIDUM AB BY TP-PA: NON REACTIVE

## 2019-08-19 ENCOUNTER — OFFICE VISIT (OUTPATIENT)
Dept: FAMILY MEDICINE CLINIC | Facility: CLINIC | Age: 24
End: 2019-08-19
Payer: COMMERCIAL

## 2019-08-19 VITALS
WEIGHT: 161.63 LBS | BODY MASS INDEX: 25.97 KG/M2 | HEART RATE: 67 BPM | TEMPERATURE: 99 F | SYSTOLIC BLOOD PRESSURE: 111 MMHG | HEIGHT: 66 IN | DIASTOLIC BLOOD PRESSURE: 56 MMHG

## 2019-08-19 DIAGNOSIS — K58.1 IRRITABLE BOWEL SYNDROME WITH CONSTIPATION: ICD-10-CM

## 2019-08-19 DIAGNOSIS — R53.83 LETHARGY: ICD-10-CM

## 2019-08-19 DIAGNOSIS — R26.89 IMBALANCE: ICD-10-CM

## 2019-08-19 DIAGNOSIS — R76.8 POSITIVE ANA (ANTINUCLEAR ANTIBODY): Primary | ICD-10-CM

## 2019-08-19 PROCEDURE — 99214 OFFICE O/P EST MOD 30 MIN: CPT | Performed by: FAMILY MEDICINE

## 2019-08-19 NOTE — PROGRESS NOTES
Patient ID: Girma Sanchez is a 21year old female. HPI  Patient presents with:  Test Results: follow up     States she has been eating poorly recently.   She is been eating more cheese and she knows she has IBS with constipation and needs to stop this OSMOCALC 286 04/09/2019    ALKPHO 64 04/09/2019    AST 12 (L) 04/09/2019    ALT 17 04/09/2019    BILT 0.5 04/09/2019    TP 7.1 04/09/2019    ALB 3.6 04/09/2019    GLOBULIN 3.5 04/09/2019     04/09/2019    K 4.1 04/09/2019     04/09/2019    CO2 Psychiatric/Behavioral: Positive for decreased concentration. The patient is not nervous/anxious. Past Medical History:   Diagnosis Date   • Depression    • Irritable bowel syndrome    • Patellofemoral syndrome        History reviewed.  No pertin Future  -     SMITH ANTIBODIES; Future  -     SJOGREN'S AB, ANTI-SS-A/-SS-B; Future  -     SERUM PROTEIN ELECTROPHORESIS; Future  -     LYME DISEASE, TOTAL AB W RFLX; Future  -     RHEUMATOLOGY - INTERNAL  Positive antinuclear antibody with titer 160.   We Jodie ARMIJO DO,  personally performed the services described in this documentation. All medical record entries made by the scribe were at my direction and in my presence.   I have reviewed the chart and discharge instructions (if applicable) an

## 2019-08-20 ENCOUNTER — TELEPHONE (OUTPATIENT)
Dept: FAMILY MEDICINE CLINIC | Facility: CLINIC | Age: 24
End: 2019-08-20

## 2019-08-20 NOTE — TELEPHONE ENCOUNTER
Spoke with the patient who is requesting to know when she should have the lab work ordered yesterday 8/19/19 done. Patient was advised to proceed to the lab as soon as possible to get the lab work done.  Patient voiced understanding and agreed with the plan

## 2019-08-21 ENCOUNTER — TELEPHONE (OUTPATIENT)
Dept: OTHER | Age: 24
End: 2019-08-21

## 2019-08-21 NOTE — TELEPHONE ENCOUNTER
Susan Cha from [address] [place] [address] calling to check if pt was tx for positive syphilis. Reviewed results with Susan Cha and per Susan Cha based on RPR being negative no treatment is needed. Per Susan Cha case will be closed on her end.

## 2019-09-10 ENCOUNTER — WALK IN (OUTPATIENT)
Dept: URGENT CARE | Age: 24
End: 2019-09-10

## 2019-09-10 VITALS
DIASTOLIC BLOOD PRESSURE: 60 MMHG | TEMPERATURE: 98.4 F | SYSTOLIC BLOOD PRESSURE: 105 MMHG | RESPIRATION RATE: 16 BRPM | OXYGEN SATURATION: 97 % | HEART RATE: 74 BPM

## 2019-09-10 DIAGNOSIS — J02.9 ACUTE VIRAL PHARYNGITIS: Primary | ICD-10-CM

## 2019-09-10 DIAGNOSIS — J02.9 SORE THROAT: ICD-10-CM

## 2019-09-10 LAB
INTERNAL PROCEDURAL CONTROLS ACCEPTABLE: YES
S PYO AG THROAT QL IA.RAPID: NEGATIVE

## 2019-09-10 PROCEDURE — 87880 STREP A ASSAY W/OPTIC: CPT | Performed by: NURSE PRACTITIONER

## 2019-09-10 PROCEDURE — 99213 OFFICE O/P EST LOW 20 MIN: CPT | Performed by: NURSE PRACTITIONER

## 2019-09-10 RX ORDER — DICYCLOMINE HYDROCHLORIDE 10 MG/1
10 CAPSULE ORAL
COMMUNITY
Start: 2018-09-21

## 2019-09-10 ASSESSMENT — ENCOUNTER SYMPTOMS
CHILLS: 1
FATIGUE: 1
RHINORRHEA: 1
SORE THROAT: 1

## 2019-09-19 ENCOUNTER — MED REC SCAN ONLY (OUTPATIENT)
Dept: FAMILY MEDICINE CLINIC | Facility: CLINIC | Age: 24
End: 2019-09-19

## 2019-09-19 ENCOUNTER — TELEPHONE (OUTPATIENT)
Dept: FAMILY MEDICINE CLINIC | Facility: CLINIC | Age: 24
End: 2019-09-19

## 2019-09-19 NOTE — TELEPHONE ENCOUNTER
Patient was given a call in regards to outside lab results. LVM asking patient if she would like to have a copy of results mailed to her home.

## 2019-09-19 NOTE — TELEPHONE ENCOUNTER
We did get your labs from the [address] from 8/21/2019. Serum protein electrophoresis was normal.  CCP was normal.  Lyme's disease test showed no evidence of Lyme's disease. The rheumatologic tests were also negative.   Do you have a copy of this l

## 2019-10-31 ENCOUNTER — TELEPHONE (OUTPATIENT)
Dept: FAMILY MEDICINE CLINIC | Facility: CLINIC | Age: 24
End: 2019-10-31

## 2019-10-31 NOTE — TELEPHONE ENCOUNTER
Maxim Mcknight, [address] [place] [address] called to obtain additional information needed for case report form. (regarding lab 8/10/19 positive treponemal ab.)   She asked if patient presented with symptoms of erythematous rash, or migraine.        Patient LOV 8/1

## 2019-12-14 ENCOUNTER — OFFICE VISIT (OUTPATIENT)
Dept: FAMILY MEDICINE CLINIC | Facility: CLINIC | Age: 24
End: 2019-12-14
Payer: COMMERCIAL

## 2019-12-14 ENCOUNTER — LAB ENCOUNTER (OUTPATIENT)
Dept: LAB | Age: 24
End: 2019-12-14
Attending: FAMILY MEDICINE
Payer: COMMERCIAL

## 2019-12-14 VITALS
TEMPERATURE: 98 F | DIASTOLIC BLOOD PRESSURE: 73 MMHG | WEIGHT: 172 LBS | SYSTOLIC BLOOD PRESSURE: 112 MMHG | HEIGHT: 66 IN | BODY MASS INDEX: 27.64 KG/M2 | HEART RATE: 89 BPM

## 2019-12-14 DIAGNOSIS — IMO0002 SEXUAL ASSAULT BY BODILY FORCE BY PERSON UNKNOWN TO VICTIM: ICD-10-CM

## 2019-12-14 DIAGNOSIS — R76.8 POSITIVE ANA (ANTINUCLEAR ANTIBODY): ICD-10-CM

## 2019-12-14 DIAGNOSIS — R53.83 LETHARGY: ICD-10-CM

## 2019-12-14 DIAGNOSIS — R26.89 IMBALANCE: ICD-10-CM

## 2019-12-14 DIAGNOSIS — K58.1 IRRITABLE BOWEL SYNDROME WITH CONSTIPATION: ICD-10-CM

## 2019-12-14 DIAGNOSIS — F32.A DEPRESSIVE DISORDER: ICD-10-CM

## 2019-12-14 DIAGNOSIS — F41.9 ANXIETY: ICD-10-CM

## 2019-12-14 DIAGNOSIS — R41.3 SHORT-TERM MEMORY LOSS: ICD-10-CM

## 2019-12-14 DIAGNOSIS — R76.8 POSITIVE ANA (ANTINUCLEAR ANTIBODY): Primary | ICD-10-CM

## 2019-12-14 PROCEDURE — 84443 ASSAY THYROID STIM HORMONE: CPT

## 2019-12-14 PROCEDURE — 86039 ANTINUCLEAR ANTIBODIES (ANA): CPT

## 2019-12-14 PROCEDURE — 86780 TREPONEMA PALLIDUM: CPT

## 2019-12-14 PROCEDURE — 86592 SYPHILIS TEST NON-TREP QUAL: CPT

## 2019-12-14 PROCEDURE — 99214 OFFICE O/P EST MOD 30 MIN: CPT | Performed by: FAMILY MEDICINE

## 2019-12-14 PROCEDURE — 85025 COMPLETE CBC W/AUTO DIFF WBC: CPT

## 2019-12-14 PROCEDURE — 36415 COLL VENOUS BLD VENIPUNCTURE: CPT

## 2019-12-14 PROCEDURE — 80053 COMPREHEN METABOLIC PANEL: CPT

## 2019-12-14 PROCEDURE — 86038 ANTINUCLEAR ANTIBODIES: CPT

## 2019-12-14 RX ORDER — DICYCLOMINE HYDROCHLORIDE 10 MG/1
10 CAPSULE ORAL
Qty: 90 CAPSULE | Refills: 0 | Status: SHIPPED | OUTPATIENT
Start: 2019-12-14

## 2019-12-14 NOTE — PROGRESS NOTES
Patient ID: Lon Gonzalez is a 21year old female. HPI  Patient presents with: Follow - Up: Work concerns     Last saw patient in 8/2019. The patient returned to work at [employer] and her symptoms returned.  Symptoms include memory lo Depression    • Irritable bowel syndrome    • Patellofemoral syndrome        History reviewed. No pertinent surgical history.        Current Outpatient Medications   Medication Sig Dispense Refill   • SERTRALINE HCL 50 MG Oral Tab TAKE 1 AND 1/2 TABLETS(75 Future  -     RHEUMATOLOGY - INTERNAL  Rheumatology referral.  Alisia No go and do some extra blood work just to make sure that the antinuclear antibody is still positive.   I wonder if some of this is not stress related although she feels that her stress is con prescribed. Approach to treatment discussed and patient/family member understands and agrees to plan. No follow-ups on file. There are no Patient Instructions on file for this visit.       Nerissa Mcgowan  12/14/2019    By signing my name below,

## 2019-12-16 ENCOUNTER — TELEPHONE (OUTPATIENT)
Dept: FAMILY MEDICINE CLINIC | Facility: CLINIC | Age: 24
End: 2019-12-16

## 2019-12-26 ENCOUNTER — TELEPHONE (OUTPATIENT)
Dept: OTHER | Age: 24
End: 2019-12-26

## 2019-12-26 NOTE — TELEPHONE ENCOUNTER
Alysha Olivares from [facility] Dept called to follow up on STD testing. Reviewed RPR results as noted below. Nothing further needed at [facility] time.      Ref Range & Units 12/14/19 10:08 AM   Rapid Plasma Reagin (RPR)  Non Reactive Non Reactive

## 2020-06-17 ENCOUNTER — TELEPHONE (OUTPATIENT)
Dept: FAMILY MEDICINE CLINIC | Facility: CLINIC | Age: 25
End: 2020-06-17

## 2020-06-18 NOTE — PROGRESS NOTES
TELEPHONE VISIT PROGRESS NOTE  Todays date: 6/18/2020 10:26 AM      Due to the COVID-19 emergency implementation plan, this patient's incoming call was converted to a telephone visit as agreed upon with the patient.     Virtual/Telephone Check-In    Dania Durbin constipation, not diarrhea. She feels her symptoms have been better since her anxiety and stress have been more controlled. She has not taken dicyclomine for 3 months.      She was off work for 1 month when the COVID-19 pandemic began, and has been back at her dose. Depressive disorder  -     Sertraline HCl 50 MG Oral Tab; TAKE 1 AND 1/2 TABLETS(75 MG) BY MOUTH DAILY FOR MOOD    Irritable bowel syndrome with constipation  This is even much better. I think her mood being better is helped her IBS.   Positive Jose Go understands phone evaluation is not a substitute for face-to-face examination or emergency care. Patient advised to go to ER or call 911 for worsening symptoms or acute distress.  (NOTE: Not every complaint above will be related to the COVID

## 2020-09-21 ENCOUNTER — TELEPHONE (OUTPATIENT)
Dept: FAMILY MEDICINE CLINIC | Facility: CLINIC | Age: 25
End: 2020-09-21

## 2020-09-21 DIAGNOSIS — F41.9 ANXIETY: ICD-10-CM

## 2020-09-21 DIAGNOSIS — F32.A DEPRESSIVE DISORDER: ICD-10-CM

## 2020-10-06 ENCOUNTER — TELEMEDICINE (OUTPATIENT)
Dept: FAMILY MEDICINE CLINIC | Facility: CLINIC | Age: 25
End: 2020-10-06

## 2020-10-06 DIAGNOSIS — F41.9 ANXIETY: ICD-10-CM

## 2020-10-06 DIAGNOSIS — K58.1 IRRITABLE BOWEL SYNDROME WITH CONSTIPATION: ICD-10-CM

## 2020-10-06 DIAGNOSIS — R14.0 ABDOMINAL BLOATING: ICD-10-CM

## 2020-10-06 DIAGNOSIS — F32.A DEPRESSIVE DISORDER: ICD-10-CM

## 2020-10-06 DIAGNOSIS — R10.84 ABDOMINAL PAIN, GENERALIZED: Primary | ICD-10-CM

## 2020-10-06 PROCEDURE — 99214 OFFICE O/P EST MOD 30 MIN: CPT | Performed by: FAMILY MEDICINE

## 2020-10-06 NOTE — PROGRESS NOTES
VIDEO VISIT PROGRESS NOTE  Todays date: 10/6/2020 8:20 AM      Due to the COVID-19 emergency implementation plan, this patient's visit was converted to a video visit as agreed upon with the patient.     Epic Video Check-In    Nati Patton verbally consen the time of the hospital visit and states she still feels bloated. They thought she had IBS as a reason for visit and gave her dicyclomine 20 mg instead of the 10 mg that she had at home. She takes it periodically only.   It has helped although she still seem to be helping. Anxiety  -     Sertraline HCl 50 MG Oral Tab; TAKE 1 AND 1/2 TABLETS(75 MG) BY MOUTH DAILY FOR MOOD  She states her anxiety is stable. Her work is very stressful but she is able to cope with the 75 mg dose and would like to continue. MG-MCG Oral Tab Take 1 tablet by mouth daily. Mitch Go advised to follow CDC guidelines for self isolation and symptomatic treatment as outlined on CDC Patient Guidelines.  Mitch Go understands video evaluation is not a substitute

## 2020-10-21 DIAGNOSIS — F32.A DEPRESSIVE DISORDER: ICD-10-CM

## 2020-10-21 DIAGNOSIS — F41.9 ANXIETY: ICD-10-CM
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
## 2021-11-20 DIAGNOSIS — F32.A DEPRESSIVE DISORDER: ICD-10-CM

## 2021-11-20 DIAGNOSIS — F41.9 ANXIETY: ICD-10-CM

## 2021-11-20 NOTE — TELEPHONE ENCOUNTER
Current Outpatient Medications:   •  Sertraline HCl 50 MG Oral Tab, TAKE 1 AND 1/2 TABLETS(75 MG) BY MOUTH DAILY FOR MOOD, Disp: 45 tablet, Rfl: 3

## 2021-11-22 NOTE — TELEPHONE ENCOUNTER
Please review; protocol failed/No Protcol    Requested Prescriptions   Pending Prescriptions Disp Refills    sertraline 50 MG Oral Tab 45 tablet 3     Sig: TAKE 1 AND 1/2 TABLETS(75 MG) BY MOUTH DAILY FOR MOOD        Psychiatric Non-Scheduled (Anti-Anxiety

## 2022-06-01 DIAGNOSIS — F41.9 ANXIETY: ICD-10-CM

## 2022-06-01 DIAGNOSIS — F32.A DEPRESSIVE DISORDER: ICD-10-CM

## 2022-06-02 NOTE — TELEPHONE ENCOUNTER
1st attempt - [facility] message sent to patient to contact the office to schedule appointment.

## 2022-06-02 NOTE — TELEPHONE ENCOUNTER
CSS please contact patient and assist in scheduling a appointment. Last office visit was 10/6/2020. Then send us the refill request so we can send it to Dr. Zac Lynch.  Thanks

## 2022-06-14 DIAGNOSIS — F32.A DEPRESSIVE DISORDER: ICD-10-CM

## 2022-06-14 DIAGNOSIS — F41.9 ANXIETY: ICD-10-CM

## 2022-06-14 NOTE — TELEPHONE ENCOUNTER
Patient requesting refill of sertraline 50 MG Oral Tab  She has an appointment scheduled on 6/20/2022  Patient is out of medication   Please send to [facility] #[phone]

## 2022-06-14 NOTE — TELEPHONE ENCOUNTER
Refill passed per [email or web address], [facility] protocol.     Requested Prescriptions   Pending Prescriptions Disp Refills    sertraline 50 MG Oral Tab 135 tablet 1     Sig: TAKE 1 AND 1/2 TABLETS(75 MG) BY MOUTH DAILY FOR MOOD        Psychiatric Non-Scheduled (Anti-Anxiety) Passed - 6/14/2022  1:01 PM        Passed - Appointment in last 6 or next 3 months            Recent Outpatient Visits              1 year ago [facility], [facility], [address], [address], [place]    Telemedicine    1 year ago [facility], [address], [address], [place]    Virtual Phone E/M    2 years ago Positive JADYN (antinuclear antibody)    [facility] [facility], [address], [address], [place], DO    Office Visit    2 years ago Positive JADYN (antinuclear antibody)    [facility], [address], [address], [place], [place]    Office Visit    2 years ago [address] [place] [address], [address], [place], [place]    Office Visit          Future Appointments         Provider Department Appt Notes    In 6 days [facility], [facility], [address], [facility]

## 2022-11-22 ENCOUNTER — HOSPITAL ENCOUNTER (OUTPATIENT)
Dept: MRI IMAGING | Age: 27
Discharge: HOME | End: 2022-11-22
Attending: ORTHOPAEDIC SURGERY

## 2022-11-22 DIAGNOSIS — M54.2 CERVICALGIA: ICD-10-CM

## 2022-11-22 PROCEDURE — 72141 MRI NECK SPINE W/O DYE: CPT

## 2023-08-16 DIAGNOSIS — F32.A DEPRESSIVE DISORDER: ICD-10-CM

## 2023-08-16 DIAGNOSIS — F41.9 ANXIETY: ICD-10-CM

## 2023-08-16 NOTE — TELEPHONE ENCOUNTER
Refill passed per [facility], [facility] protocol.     Requested Prescriptions   Pending Prescriptions Disp Refills    sertraline 50 MG Oral Tab 135 tablet 0     Sig: TAKE 1 AND 1/2 TABLETS(75 MG) BY MOUTH DAILY FOR MOOD Please keep upcoming appointment       Psychiatric Non-Scheduled (Anti-Anxiety) Passed - 8/16/2023 12:26 PM        Passed - In person appointment or virtual visit in the past 6 mos or appointment in next 3 mos     Recent Outpatient Visits              1 year ago [address], [address], [place] Valentino DO    Telemedicine    2 years ago Abdominal pain, generalized    [facility], [address], [address], [place], DO    Telemedicine    3 years ago [facility], David Valentino,     Virtual Phone E/M    3 years ago Positive JADYN (antinuclear antibody)    [address], [address], [place], [place]    Office Visit    3 years ago Positive JADYN (antinuclear antibody)    [address], [address], [place], DO    Office Visit          Future Appointments         Provider Department Appt Notes    In 4 weeks DO Zana Fernández, David Carpal tunnel/rheumatoid arthritis                  Future Appointments         Provider Department Appt Notes    In 4 weeks DO Danni Fernández [address], David Carpal tunnel/rheumatoid arthritis          Recent Outpatient Visits              1 year ago [facility], David Valentino DO    Telemedicine    2 years ago Abdominal pain, generalized    Zana Cooney, [place] Valentino DO    Telemedicine    3 years ago [address] [place], [place] [address]David DO    Virtual Phone E/M    3 years ago Positive JADYN (antinuclear antibody) David Mckinney, [place],     Office Visit    3 years ago Positive JADYN (antinuclear antibody)    [address]ans[address] Pe[address], [place], [place]    Office Visit

## 2023-10-05 ENCOUNTER — TELEMEDICINE (OUTPATIENT)
Dept: FAMILY MEDICINE CLINIC | Facility: CLINIC | Age: 28
End: 2023-10-05

## 2023-10-05 DIAGNOSIS — G56.01 CARPAL TUNNEL SYNDROME OF RIGHT WRIST: Primary | ICD-10-CM

## 2023-10-05 DIAGNOSIS — F32.A DEPRESSIVE DISORDER: ICD-10-CM

## 2023-10-05 DIAGNOSIS — F41.9 ANXIETY: ICD-10-CM

## 2023-10-05 DIAGNOSIS — R76.8 POSITIVE ANA (ANTINUCLEAR ANTIBODY): ICD-10-CM

## 2023-10-05 PROCEDURE — 99214 OFFICE O/P EST MOD 30 MIN: CPT | Performed by: FAMILY MEDICINE

## 2023-11-21 DIAGNOSIS — F32.A DEPRESSIVE DISORDER: ICD-10-CM

## 2023-11-21 DIAGNOSIS — F41.9 ANXIETY: ICD-10-CM

## 2023-11-22 NOTE — TELEPHONE ENCOUNTER
Refill passed per [facility], [facility] protocol. Requested Prescriptions   Pending Prescriptions Disp Refills    sertraline 50 MG Oral Tab 90 tablet 0     Sig: Take 1.5 tablets (75 mg total) by mouth daily.        Psychiatric Non-Scheduled (Anti-Anxiety) Passed - 11/21/2023  4:25 PM        Passed - In person appointment or virtual visit in the past 6 mos or appointment in next 3 mos     Recent Outpatient Visits              1 month ago Carpal tunnel syndrome of right wrist    [address] [facility], [address], [place], [place]    Telemedicine    1 year ago [address] [place] [address], David Travis, DO    Telemedicine    3 years ago Abdominal pain, [facility], [address], [address], [place], [place]    Telemedicine    3 years ago [address] [place] [address], [place] Travis, DO    Virtual Phone E/M    3 years ago Positive JADYN (antinuclear antibody)    [address], [address], [address], [place], [place]    Office Visit                           Recent Outpatient Visits              1 month ago Carpal tunnel syndrome of right wrist    [address] [facility], [place], DO    Telemedicine    1 year ago [address] [place] [address], David Travis, DO    Telemedicine    3 years ago Abdominal pain, [facility], [address], [address], [place], [place]    Telemedicine    3 years ago [address] [place] [address], David Travis, DO    Virtual Phone E/M    3 years ago Positive JADYN (antinuclear antibody)    [address], [address], [address], [place], [place]    Office Visit

## 2025-01-08 DIAGNOSIS — F41.9 ANXIETY: ICD-10-CM

## 2025-01-08 DIAGNOSIS — F32.A DEPRESSIVE DISORDER: ICD-10-CM

## 2025-01-13 NOTE — TELEPHONE ENCOUNTER
Refusing refill: patient needs to be seen in office for refills.    Patient has not been seen in-office by Dr. Salter since 12/14/2019.    Last visit was Telemedicine visit on 10/5/2023.    Patient does not have any future appointments coming up.    *Even though patient is stating they have one scheduled for 1/25

## 2025-06-23 DIAGNOSIS — F32.A DEPRESSIVE DISORDER: ICD-10-CM

## 2025-06-23 DIAGNOSIS — F41.9 ANXIETY: ICD-10-CM

## 2025-06-26 NOTE — TELEPHONE ENCOUNTER
Dr. Salter please advise, Last visit was Telemedicine : 10/5/2023.   Last office visit in person 12/14/2019.    Sertraline last written 11/22/2023 for total 8 month supply.     [employer] Message sent to patient to clarify if they have been getting refills from alternate provider.    Patient is scheduled.  Future Appointments   Date Time Provider Department Center   9/12/2025  2:00 PM [facility]

## 2025-06-27 NOTE — TELEPHONE ENCOUNTER
Refill failed [facility] protocol.    Please review pended prescription for a 47 day supply to get to appointment with provider.    Patient scheduled appointment   Future Appointments   Date Time Provider Department Center   9/12/2025  2:00 PM Juanito Salter DO [facility]

## 2025-08-29 ENCOUNTER — OFFICE VISIT (OUTPATIENT)
Dept: FAMILY MEDICINE CLINIC | Facility: CLINIC | Age: 30
End: 2025-08-29

## 2025-08-29 VITALS
OXYGEN SATURATION: 99 % | HEIGHT: 66 IN | TEMPERATURE: 98 F | HEART RATE: 67 BPM | SYSTOLIC BLOOD PRESSURE: 113 MMHG | BODY MASS INDEX: 34.55 KG/M2 | DIASTOLIC BLOOD PRESSURE: 75 MMHG | WEIGHT: 215 LBS

## 2025-08-29 DIAGNOSIS — R06.00 DYSPNEA, UNSPECIFIED TYPE: Primary | ICD-10-CM

## 2025-08-29 DIAGNOSIS — R53.83 LETHARGY: ICD-10-CM

## 2025-08-29 DIAGNOSIS — R05.1 ACUTE COUGH: ICD-10-CM

## 2025-08-29 DIAGNOSIS — F41.9 ANXIETY: ICD-10-CM

## 2025-08-29 PROCEDURE — 3074F SYST BP LT 130 MM HG: CPT | Performed by: FAMILY MEDICINE

## 2025-08-29 PROCEDURE — G2211 COMPLEX E/M VISIT ADD ON: HCPCS | Performed by: FAMILY MEDICINE

## 2025-08-29 PROCEDURE — 3008F BODY MASS INDEX DOCD: CPT | Performed by: FAMILY MEDICINE

## 2025-08-29 PROCEDURE — 99214 OFFICE O/P EST MOD 30 MIN: CPT | Performed by: FAMILY MEDICINE

## 2025-08-29 PROCEDURE — 3078F DIAST BP <80 MM HG: CPT | Performed by: FAMILY MEDICINE

## 2025-08-29 RX ORDER — AZITHROMYCIN 250 MG/1
TABLET, FILM COATED ORAL
Qty: 6 TABLET | Refills: 0 | Status: SHIPPED | OUTPATIENT
Start: 2025-08-29 | End: 2025-09-03

## 2025-08-29 RX ORDER — PREDNISONE 20 MG/1
TABLET ORAL
Qty: 10 TABLET | Refills: 0 | Status: SHIPPED | OUTPATIENT
Start: 2025-08-29

## 2025-08-29 RX ORDER — ALBUTEROL SULFATE 90 UG/1
2 INHALANT RESPIRATORY (INHALATION) EVERY 6 HOURS PRN
Qty: 1 EACH | Refills: 0 | Status: SHIPPED | OUTPATIENT
Start: 2025-08-29

## (undated) NOTE — LETTER
11/11/19        Suzan Goes  [address] [place] [address]  Yakov Leon [id number]      Dear Marcelle Gallegos,    [address] [facility] records indicate that you have outstanding lab work and or testing that was ordered for you and has not yet been completed:  Orders Placed This Encounter

## (undated) NOTE — LETTER
09/18/19        Elfrieda Scheuermann  [address] [place] [id number]      Dear Patricia [facility],    [address] [facility] records indicate that you have outstanding lab work and or testing that was ordered for you and has not yet been completed:  Orders Placed This Encounter

## (undated) NOTE — LETTER
09/18/19    Tessie Blizzard  [address] [place] [id number]      Dear David Marcos,    [address] [facility] records indicate that you have outstanding lab work and or testing that was ordered for you and has not yet been completed:  Orders Placed This Encounter      Anti-

## (undated) NOTE — LETTER
01/18/20        Shona Carrillo  [address] [place] [address]  Aliya Annettejoseph [id number]      Dear Richelle Fleming,    [address] [facility] records indicate that you have outstanding lab work and or testing that was ordered for you and has not yet been completed:  Orders Placed This Encounter